# Patient Record
Sex: MALE | Race: WHITE | URBAN - METROPOLITAN AREA
[De-identification: names, ages, dates, MRNs, and addresses within clinical notes are randomized per-mention and may not be internally consistent; named-entity substitution may affect disease eponyms.]

---

## 2017-07-07 ENCOUNTER — OFFICE VISIT (OUTPATIENT)
Dept: NEUROSURGERY | Facility: CLINIC | Age: 72
End: 2017-07-07
Payer: COMMERCIAL

## 2017-07-07 VITALS — HEIGHT: 68 IN | BODY MASS INDEX: 36.78 KG/M2 | WEIGHT: 242.7 LBS | TEMPERATURE: 97.1 F

## 2017-07-07 DIAGNOSIS — M54.12 CERVICAL RADICULOPATHY: Primary | ICD-10-CM

## 2017-07-07 PROCEDURE — 99203 OFFICE O/P NEW LOW 30 MIN: CPT | Performed by: PHYSICIAN ASSISTANT

## 2017-07-07 RX ORDER — FINASTERIDE 5 MG/1
5 TABLET, FILM COATED ORAL
COMMUNITY

## 2017-07-07 RX ORDER — SIMVASTATIN 40 MG
TABLET ORAL
COMMUNITY
Start: 2015-11-05

## 2017-07-07 RX ORDER — ACETAMINOPHEN 160 MG
TABLET,DISINTEGRATING ORAL
COMMUNITY

## 2017-07-07 RX ORDER — LANOLIN ALCOHOL/MO/W.PET/CERES
3 CREAM (GRAM) TOPICAL
COMMUNITY
Start: 2015-01-28

## 2017-07-07 RX ORDER — LISINOPRIL 5 MG/1
2.5 TABLET ORAL
COMMUNITY
Start: 2017-02-27

## 2017-07-07 RX ORDER — BUMETANIDE 1 MG/1
1-2 TABLET ORAL
COMMUNITY
Start: 2015-11-05

## 2017-07-07 RX ORDER — POTASSIUM CHLORIDE 1500 MG/1
20 TABLET, EXTENDED RELEASE ORAL
COMMUNITY
Start: 2015-11-05

## 2017-07-07 RX ORDER — LORATADINE 10 MG/1
10 TABLET ORAL
COMMUNITY
Start: 2014-07-18

## 2017-07-07 RX ORDER — CARVEDILOL 25 MG/1
25 TABLET ORAL
COMMUNITY
Start: 2015-11-05

## 2017-07-07 ASSESSMENT — PAIN SCALES - GENERAL: PAINLEVEL: MILD PAIN (3)

## 2017-07-07 NOTE — MR AVS SNAPSHOT
"              After Visit Summary   2017    Wilman Inman    MRN: [id number]           Patient Information     Date Of Birth          1945        Visit Information        Provider Department      2017 2:50 PM Sera Breen PA-C [facility]        Care Instructions    1. [facility] CT from within the past year    2. As soon as we are able to review these images - I will call to review and discuss next steps          Follow-ups after your visit        Who to contact     If you have questions or need follow up information about today's clinic visit or your schedule please contact [facility] directly at [phone].  Normal or non-critical lab and imaging results will be communicated to you by MyChart, letter or phone within 4 business days after the clinic has received the results. If you do not hear from us within 7 days, please contact the clinic through [employer] or phone. If you have a critical or abnormal lab result, we will notify you by phone as soon as possible.  Submit refill requests through [employer] or call your pharmacy and they will forward the refill request to us. Please allow 3 business days for your refill to be completed.          Additional Information About Your Visit        MyChart Information     [employer] lets you send messages to your doctor, view your test results, renew your prescriptions, schedule appointments and more. To sign up, go to [email or web address] . Click on \"Log in\" on the left side of the screen, which will take you to the Welcome page. Then click on \"Sign up Now\" on the right side of the page.     You will be asked to enter the access code listed below, as well as some personal information. Please follow the directions to create your username and password.     Your access code is: [email or web address]  Expires: 10/5/2017  3:18 PM     Your access code will  in 90 days. If you need help " "or a new code, please call your [place] clinic or [phone].        Care EveryWhere ID     This is your Care EveryWhere ID. This could be used by other organizations to access your [place] medical records  [id number]        Your Vitals Were     Temperature Height BMI (Body Mass Index)             97.1  F (36.2  C) (Temporal) 5' 7.5\" (1.715 m) 37.45 kg/m2          Blood Pressure from Last 3 Encounters:   No data found for BP    Weight from Last 3 Encounters:   07/07/17 242 lb 11.2 oz (110.1 kg)              Today, you had the following     No orders found for display       Primary Care Provider    None Specified       No primary provider on file.        Equal Access to Services     JOSE ANTONIO MEIER : Patrice Cam, deb prabhakar, jd kaalmairina borrego, brian santos . So [facility] [phone].    ATENCIÓN: Si habla español, tiene a zarate disposición servicios gratuitos de asistencia lingüística. Llame al [phone].    We comply with applicable federal civil rights laws and [place] laws. We do not discriminate on the basis of race, color, national origin, age, disability sex, sexual orientation or gender identity.            Thank you!     Thank you for choosing [facility]  for your care. Our goal is always to provide you with excellent care. Hearing back from our patients is one way we can continue to improve our services. Please take a few minutes to complete the written survey that you may receive in the mail after your visit with us. Thank you!             Your Updated Medication List - Protect others around you: Learn how to safely use, store and throw away your medicines at www.disposemymeds.org.          This list is accurate as of: 7/7/17  3:18 PM.  Always use your most recent med list.                   Brand Name Dispense Instructions for use Diagnosis    acetaminophen 500 MG Caps      Take 500 mg by mouth        aspirin  MG EC tablet    "   325 mg        bumetanide 1 MG tablet    BUMEX     Take 1-2 mg by mouth        carvedilol 25 MG tablet    COREG     Take 25 mg by mouth        finasteride 5 MG tablet    PROSCAR     Take 5 mg by mouth        lisinopril 5 MG tablet    PRINIVIL/ZESTRIL     Take 2.5 mg by mouth        loratadine 10 MG tablet    CLARITIN     Take 10 mg by mouth        melatonin 3 MG tablet      Take 3 mg by mouth        metFORMIN 1000 MG tablet    GLUCOPHAGE     Take 1,000 mg by mouth        OTHER MEDICAL SUPPLIES      CPAP, heated humidifier, mask, headgear, filters and tubing. For home use. Pressure: auto titrating 5-20 cm  Length of Need: lifelong        potassium chloride SA 20 MEQ CR tablet    K-DUR/KLOR-CON M     Take 20 mEq by mouth        simvastatin 40 MG tablet    ZOCOR     Take 1/2 tab by mouth once daily        vitamin D3 2000 UNITS Caps
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
## 2017-07-07 NOTE — PATIENT INSTRUCTIONS
1. [facility] - [facility] CT from within the past year    2. As soon as we are able to review these images - I will call to review and discuss next steps

## 2017-07-07 NOTE — PROGRESS NOTES
"Wilman Inman is a 71 year old male who presents for:  Chief Complaint   Patient presents with     Neurologic Problem     neck pain        Initial Vitals:  Temp 97.1  F (36.2  C) (Temporal)  Ht 1.715 m (5' 7.5\")  Wt 110.1 kg (242 lb 11.2 oz)  BMI 37.45 kg/m2 Estimated body mass index is 37.45 kg/(m^2) as calculated from the following:    Height as of this encounter: 1.715 m (5' 7.5\").    Weight as of this encounter: 110.1 kg (242 lb 11.2 oz).. Body surface area is 2.29 meters squared. BP completed using cuff size: NA (Not Taken)  Mild Pain (3)    Do you feel safe in your environment?  Yes  Do you need any refills today? No    Nursing Comments:         Huong Mauricio CMA ([facility])    "

## 2017-07-10 ENCOUNTER — TELEPHONE (OUTPATIENT)
Dept: NEUROSURGERY | Facility: CLINIC | Age: 72
End: 2017-07-10

## 2017-07-10 NOTE — TELEPHONE ENCOUNTER
Patient's wife called to let you know that [facility] is [facility]man[facility] for your review.

## 2017-07-18 NOTE — TELEPHONE ENCOUNTER
Patient's wife is calling. Looking to see if we have received the xrays from [facility]? Please call back regarding this and would like to know the results.

## 2017-07-19 NOTE — TELEPHONE ENCOUNTER
[facility] They're in pacs along with the records. Patient and his wife would like to be called with recommendations/plan of care. Will forward to Sera Breen PA-C.

## 2017-07-20 NOTE — TELEPHONE ENCOUNTER
Spoke with patient and wife. Patient is unable to get MRI because he has a pacemaker. Based on cervical CT and XR, Sera would recommend PT and LENCHO if patient desires to try a second one. Offered these options to the patient, but he wants to speak directly to Sera instead.     Patient also requesting a note with Sera's recommendations and plan of care sent to patient's home address and [facility] (attn to RAGHU Connell). Will forward message to Sera. Patient and wife voiced understanding and agreement.

## 2017-07-20 NOTE — TELEPHONE ENCOUNTER
Per Sera Breen PA-C, contacted patient to see if he is able to get MRI. If so, we would order cervical MRI and call with results. LVM, awaiting call back.

## 2017-07-24 NOTE — TELEPHONE ENCOUNTER
Spoke with patient regarding CT results. Reviewed by both Dr. Acuña and myself and recommend based on imaging and symptoms cervical LENCHO and physical therapy. Patient states they would like documentation of visit and current reccomdations mailed to them and they will [facility] with [facility] to determine [facility] they need to go in order for them to cover both of these referrals. I will have 2 copies mailed to patient's home for [facility] to [facility] to [facility]. He will then contact us to let us know where he would like LENCHO and PT ordered. Patient voiced understanding and agreement.